# Patient Record
(demographics unavailable — no encounter records)

---

## 2025-05-01 NOTE — DISCUSSION/SUMMARY
[FreeTextEntry1] : CP/SOB check echo if able to approve and schedule. Palps check 7 day holter if able to approve and schedule. EKG section of the chart --- secondary to symptoms above an electrocardiogram also known as an EKG was performed.  Risks and benefits discussed with the patient. Patient was given time and privacy to changed into a gown. Shortly after, standard 10 leads were applied and a [employer] system was used to perform the study. The results were subsequently reviewed by attending physician and discussed with the patient. The study showed a normal sinus rhythm and no ST-T suggestive of ischemia. Order for the EKG was placed in the chart. The results were documented. Billing submitted. [EKG obtained to assist in diagnosis and management of assessed problem(s)] : EKG obtained to assist in diagnosis and management of assessed problem(s)

## 2025-05-01 NOTE — REASON FOR VISIT
[Symptom and Test Evaluation] : symptom and test evaluation [FreeTextEntry1] : Charito presents for a follow-up after about 5 years. She had several episodes of chest discomfort and a racing heartbeat (palpitations). She admits to increased stress, but moving on from her job and taking some time to travel. Her pain was pressure like and lasted a few minutes. It was associated with dyspnea and lasted a few minutes. She admits to feeling palpitations at times, but didn't pass out on that occasion. We are discussing a cardiac work up for the above complains as one has not been done recently.